# Patient Record
Sex: MALE | Race: WHITE | NOT HISPANIC OR LATINO | Employment: FULL TIME | URBAN - NONMETROPOLITAN AREA
[De-identification: names, ages, dates, MRNs, and addresses within clinical notes are randomized per-mention and may not be internally consistent; named-entity substitution may affect disease eponyms.]

---

## 2018-10-08 ENCOUNTER — OCCUPATIONAL MEDICINE (OUTPATIENT)
Dept: URGENT CARE | Facility: PHYSICIAN GROUP | Age: 58
End: 2018-10-08

## 2018-10-08 VITALS
BODY MASS INDEX: 31.07 KG/M2 | SYSTOLIC BLOOD PRESSURE: 124 MMHG | HEART RATE: 86 BPM | HEIGHT: 70 IN | TEMPERATURE: 97.7 F | WEIGHT: 217 LBS | RESPIRATION RATE: 16 BRPM | OXYGEN SATURATION: 97 % | DIASTOLIC BLOOD PRESSURE: 80 MMHG

## 2018-10-08 DIAGNOSIS — Z02.89 EXAMINATION, PHYSICAL, EMPLOYEE: Primary | ICD-10-CM

## 2018-10-08 PROCEDURE — 8915 PR COMPREHENSIVE PHYSICAL: Performed by: PHYSICIAN ASSISTANT

## 2018-10-08 RX ORDER — PIOGLITAZONEHYDROCHLORIDE 45 MG/1
45 TABLET ORAL DAILY
COMMUNITY

## 2018-10-08 RX ORDER — ATORVASTATIN CALCIUM 10 MG/1
10 TABLET, FILM COATED ORAL NIGHTLY
COMMUNITY

## 2018-10-08 NOTE — PROGRESS NOTES
Patient is here for a preemployment physical and denies any issues at this time. All paperwork reviewed.  Urine shows 500 glucose.  Point-of-care glucose testing is 302.  Patient denies any hypoglycemic episodes in at least the last 5 years.  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  His primary care provider is in [place], [place].  Exam normal. Cleared for work for 1 year.

## 2019-01-15 ENCOUNTER — APPOINTMENT (OUTPATIENT)
Dept: RADIOLOGY | Facility: IMAGING CENTER | Age: 59
End: 2019-01-15
Attending: PHYSICIAN ASSISTANT

## 2019-01-15 ENCOUNTER — HOSPITAL ENCOUNTER (OUTPATIENT)
Facility: MEDICAL CENTER | Age: 59
End: 2019-01-15
Attending: PHYSICIAN ASSISTANT
Payer: COMMERCIAL

## 2019-01-15 ENCOUNTER — OCCUPATIONAL MEDICINE (OUTPATIENT)
Dept: URGENT CARE | Facility: PHYSICIAN GROUP | Age: 59
End: 2019-01-15

## 2019-01-15 VITALS
SYSTOLIC BLOOD PRESSURE: 120 MMHG | OXYGEN SATURATION: 98 % | HEIGHT: 70 IN | DIASTOLIC BLOOD PRESSURE: 70 MMHG | RESPIRATION RATE: 12 BRPM | TEMPERATURE: 97.5 F | HEART RATE: 74 BPM | WEIGHT: 212 LBS | BODY MASS INDEX: 30.35 KG/M2

## 2019-01-15 DIAGNOSIS — Z02.1 PHYSICAL EXAM, PRE-EMPLOYMENT: Primary | ICD-10-CM

## 2019-01-15 DIAGNOSIS — Z02.1 PHYSICAL EXAM, PRE-EMPLOYMENT: ICD-10-CM

## 2019-01-15 LAB
BASOPHILS # BLD AUTO: 2.3 % (ref 0–1.8)
BASOPHILS # BLD: 0.16 K/UL (ref 0–0.12)
EOSINOPHIL # BLD AUTO: 0.57 K/UL (ref 0–0.51)
EOSINOPHIL NFR BLD: 8.2 % (ref 0–6.9)
ERYTHROCYTE [DISTWIDTH] IN BLOOD BY AUTOMATED COUNT: 40.3 FL (ref 35.9–50)
HCT VFR BLD AUTO: 50.7 % (ref 42–52)
HGB BLD-MCNC: 17.3 G/DL (ref 14–18)
IMM GRANULOCYTES # BLD AUTO: 0.01 K/UL (ref 0–0.11)
IMM GRANULOCYTES NFR BLD AUTO: 0.1 % (ref 0–0.9)
LYMPHOCYTES # BLD AUTO: 1.98 K/UL (ref 1–4.8)
LYMPHOCYTES NFR BLD: 28.5 % (ref 22–41)
MCH RBC QN AUTO: 30.8 PG (ref 27–33)
MCHC RBC AUTO-ENTMCNC: 34.1 G/DL (ref 33.7–35.3)
MCV RBC AUTO: 90.2 FL (ref 81.4–97.8)
MONOCYTES # BLD AUTO: 0.53 K/UL (ref 0–0.85)
MONOCYTES NFR BLD AUTO: 7.6 % (ref 0–13.4)
NEUTROPHILS # BLD AUTO: 3.7 K/UL (ref 1.82–7.42)
NEUTROPHILS NFR BLD: 53.3 % (ref 44–72)
NRBC # BLD AUTO: 0 K/UL
NRBC BLD-RTO: 0 /100 WBC
PLATELET # BLD AUTO: 295 K/UL (ref 164–446)
PMV BLD AUTO: 11.8 FL (ref 9–12.9)
RBC # BLD AUTO: 5.62 M/UL (ref 4.7–6.1)
WBC # BLD AUTO: 7 K/UL (ref 4.8–10.8)

## 2019-01-15 PROCEDURE — 71045 X-RAY EXAM CHEST 1 VIEW: CPT | Mod: 26 | Performed by: PHYSICIAN ASSISTANT

## 2019-01-15 PROCEDURE — 8915 PR COMPREHENSIVE PHYSICAL: Performed by: INTERNAL MEDICINE

## 2019-01-15 ASSESSMENT — ENCOUNTER SYMPTOMS
EYE DISCHARGE: 0
FLANK PAIN: 0
SORE THROAT: 0
ABDOMINAL PAIN: 0
HEADACHES: 0
PHOTOPHOBIA: 0
EYE PAIN: 0
COUGH: 0
BLURRED VISION: 0
FEVER: 0
NAUSEA: 0
DIARRHEA: 0
SPUTUM PRODUCTION: 0
SINUS PAIN: 0
VOMITING: 0
DOUBLE VISION: 0
MYALGIAS: 0
CHILLS: 0
CONSTIPATION: 0

## 2019-01-16 NOTE — PROGRESS NOTES
"Subjective:   Fermin Dumont is a 58 y.o. male who presents for Employment Physical (EOTI )       Patient presents today for employment physical. He has no concerns today.       Review of Systems   Constitutional: Negative for chills and fever.   HENT: Negative for congestion, ear discharge, ear pain, hearing loss, sinus pain, sore throat and tinnitus.    Eyes: Negative for blurred vision, double vision, photophobia, pain and discharge.   Respiratory: Negative for cough and sputum production.    Cardiovascular: Negative for chest pain.   Gastrointestinal: Negative for abdominal pain, constipation, diarrhea, nausea and vomiting.   Genitourinary: Negative for dysuria, flank pain, frequency and urgency.   Musculoskeletal: Negative for myalgias.   Neurological: Negative for headaches.       PMH:  has no past medical history on file.    MEDS:   Current Outpatient Prescriptions:   •  Dapagliflozin-Metformin HCl ER (XIGDUO XR) 5-500 MG TABLET SR 24 HR, Take  by mouth., Disp: , Rfl:   •  Linagliptin (TRADJENTA PO), Take 5 mg by mouth., Disp: , Rfl:   •  pioglitazone (ACTOS) 45 MG Tab, Take 45 mg by mouth every day., Disp: , Rfl:   •  atorvastatin (LIPITOR) 10 MG Tab, Take 10 mg by mouth every evening., Disp: , Rfl:     ALLERGIES: No Known Allergies    SURGHX: No past surgical history on file.    SOCHX:      FH: Reviewed with patient, not pertinent to this visit.     Objective:   /70   Pulse 74   Temp 36.4 °C (97.5 °F)   Resp 12   Ht 1.778 m (5' 10\")   Wt 96.2 kg (212 lb)   SpO2 98%   BMI 30.42 kg/m²   Physical Exam   Constitutional: He is oriented to person, place, and time. He appears well-developed and well-nourished. No distress.   HENT:   Head: Normocephalic and atraumatic.   Right Ear: Hearing, tympanic membrane, external ear and ear canal normal.   Left Ear: Hearing, tympanic membrane, external ear and ear canal normal.   Nose: Nose normal.   Mouth/Throat: Uvula is midline, oropharynx is clear and moist " and mucous membranes are normal.   Eyes: Pupils are equal, round, and reactive to light. Conjunctivae and EOM are normal.   Neck: Normal range of motion. Neck supple. No tracheal deviation present. No thyromegaly present.   Cardiovascular: Normal rate, regular rhythm and normal heart sounds.    Pulmonary/Chest: Effort normal and breath sounds normal. No respiratory distress.   Musculoskeletal: He exhibits no tenderness or deformity.   Limited external rotation of left shoulder, otherwise normal ROM all four extremities   Lymphadenopathy:     He has no cervical adenopathy.   Neurological: He is alert and oriented to person, place, and time. He has normal strength. No cranial nerve deficit or sensory deficit. He exhibits normal muscle tone. He displays a negative Romberg sign. Coordination and gait normal.   Skin: Skin is warm and dry. Capillary refill takes less than 2 seconds.   Psychiatric: He has a normal mood and affect. His behavior is normal. Judgment and thought content normal.       Assessment/Plan:   1. Physical exam, pre-employment  - DX-CHEST-LIMITED (1 VIEW); Future  - POCT Urinalysis  - CBC WITH DIFFERENTIAL; Future  - Chest x-ray, audiogram normal  - Cleared for work without restrictions    Differential diagnosis, natural history, supportive care, and indications for immediate follow-up discussed.

## 2019-10-29 ENCOUNTER — OCCUPATIONAL MEDICINE (OUTPATIENT)
Dept: URGENT CARE | Facility: PHYSICIAN GROUP | Age: 59
End: 2019-10-29

## 2019-10-29 VITALS
WEIGHT: 194 LBS | SYSTOLIC BLOOD PRESSURE: 132 MMHG | BODY MASS INDEX: 27.77 KG/M2 | OXYGEN SATURATION: 98 % | HEART RATE: 70 BPM | HEIGHT: 70 IN | TEMPERATURE: 98.2 F | DIASTOLIC BLOOD PRESSURE: 78 MMHG | RESPIRATION RATE: 16 BRPM

## 2019-10-29 DIAGNOSIS — Z02.4 ENCOUNTER FOR CDL (COMMERCIAL DRIVING LICENSE) EXAM: ICD-10-CM

## 2019-10-29 LAB
GLUCOSE BLD-MCNC: 147 MG/DL (ref 70–100)
HBA1C MFR BLD: 6.8 % (ref 0–5.6)
INT CON NEG: NEGATIVE
INT CON POS: POSITIVE

## 2019-10-29 PROCEDURE — 83036 HEMOGLOBIN GLYCOSYLATED A1C: CPT | Performed by: PHYSICIAN ASSISTANT

## 2019-10-29 PROCEDURE — 82962 GLUCOSE BLOOD TEST: CPT | Performed by: PHYSICIAN ASSISTANT

## 2019-10-29 PROCEDURE — 7100 PR DOT PHYSICAL: Performed by: PHYSICIAN ASSISTANT

## 2019-10-29 NOTE — PROGRESS NOTES
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  Patient takes prescription medication for diabetes and cholesterol.  Patient is not on insulin.  No available A1c so point-of-care testing was done in clinic..  Patient denies chest pain, shortness of breath, orthopnea, PND, leg swelling.  All paperwork was reviewed.  Exam is normal.  Cleared for 1 year.  Patient [facility] states it was 7.2.  Patient does not have a hard copy on his person.     POCT B  POCT A1c:6.8

## 2020-09-14 ENCOUNTER — HOSPITAL ENCOUNTER (OUTPATIENT)
Facility: MEDICAL CENTER | Age: 60
End: 2020-09-14
Attending: FAMILY MEDICINE
Payer: COMMERCIAL

## 2020-09-14 ENCOUNTER — APPOINTMENT (OUTPATIENT)
Dept: RADIOLOGY | Facility: IMAGING CENTER | Age: 60
End: 2020-09-14
Attending: FAMILY MEDICINE

## 2020-09-14 ENCOUNTER — OFFICE VISIT (OUTPATIENT)
Dept: URGENT CARE | Facility: CLINIC | Age: 60
End: 2020-09-14

## 2020-09-14 VITALS
WEIGHT: 195 LBS | TEMPERATURE: 98.5 F | SYSTOLIC BLOOD PRESSURE: 132 MMHG | HEIGHT: 70 IN | OXYGEN SATURATION: 99 % | DIASTOLIC BLOOD PRESSURE: 80 MMHG | HEART RATE: 88 BPM | RESPIRATION RATE: 14 BRPM | BODY MASS INDEX: 27.92 KG/M2

## 2020-09-14 DIAGNOSIS — Z02.89 ENCOUNTER FOR PHYSICAL EXAMINATION RELATED TO EMPLOYMENT: ICD-10-CM

## 2020-09-14 DIAGNOSIS — Z02.89 ENCOUNTER FOR OCCUPATIONAL HEALTH ASSESSMENT: ICD-10-CM

## 2020-09-14 PROCEDURE — 8915 PR COMPREHENSIVE PHYSICAL: Performed by: FAMILY MEDICINE

## 2020-09-14 PROCEDURE — 7100 PR DOT PHYSICAL: Performed by: FAMILY MEDICINE

## 2020-09-14 PROCEDURE — 71046 X-RAY EXAM CHEST 2 VIEWS: CPT | Mod: TC | Performed by: FAMILY MEDICINE

## 2020-09-14 PROCEDURE — 85025 COMPLETE CBC W/AUTO DIFF WBC: CPT | Performed by: FAMILY MEDICINE

## 2020-09-14 PROCEDURE — 94010 BREATHING CAPACITY TEST: CPT | Performed by: FAMILY MEDICINE

## 2020-09-14 PROCEDURE — 92552 PURE TONE AUDIOMETRY AIR: CPT | Performed by: FAMILY MEDICINE

## 2020-09-14 PROCEDURE — 8912 PR VISION SCREENING: Performed by: FAMILY MEDICINE

## 2020-09-14 PROCEDURE — 93000 ELECTROCARDIOGRAM COMPLETE: CPT | Performed by: FAMILY MEDICINE

## 2020-09-14 SDOH — HEALTH STABILITY: MENTAL HEALTH: HOW OFTEN DO YOU HAVE A DRINK CONTAINING ALCOHOL?: NEVER

## 2020-09-14 NOTE — PROGRESS NOTES
Chief Complaint:    Chief Complaint   Patient presents with   • Employment Physical       History of Present Illness:    Here for employment exam.      Review of Systems:    Constitutional: Negative for fever, chills, and diaphoresis.   Eyes: Negative for change in vision, photophobia, pain, redness, and discharge.  ENT: Negative for ear pain, ear discharge, hearing loss, tinnitus, nasal congestion, nosebleeds, and sore throat.    Respiratory: Occl cough. Negative for hemoptysis, shortness of breath, wheezing, and stridor.    Cardiovascular: Negative for chest pain, palpitations, orthopnea, claudication, leg swelling, and PND.   Gastrointestinal: Negative for abdominal pain, nausea, vomiting, diarrhea, constipation, blood in stool, and melena.   Genitourinary: Negative for dysuria, urinary urgency, urinary frequency, hematuria, and flank pain.   Musculoskeletal: Negative for myalgias, joint pain, neck pain, and back pain.   Skin: Negative for rash and itching.   Neurological: Negative for dizziness, tingling, tremors, sensory change, speech change, focal weakness, seizures, loss of consciousness, and headaches.   Endo: Diabetic, on medication. Hgb A1C 6.8 (10/29/19).  Heme: Does not bruise/bleed easily.   Psychiatric/Behavioral: Negative for depression, suicidal ideas, hallucinations, memory loss and substance abuse. The patient is not nervous/anxious and does not have insomnia.        Past Medical History:    History reviewed. No pertinent past medical history.    Past Surgical History:    History reviewed. No pertinent surgical history.    Social History:    Social History     Socioeconomic History   • Marital status: Unknown     Spouse name: Not on file   • Number of children: Not on file   • Years of education: Not on file   • Highest education level: Not on file   Occupational History   • Not on file   Social Needs   • Financial resource strain: Not on file   • Food insecurity     Worry: Not on file    "Inability: Not on file   • Transportation needs     Medical: Not on file     Non-medical: Not on file   Tobacco Use   • Smoking status: Never Smoker   • Smokeless tobacco: Never Used   Substance and Sexual Activity   • Alcohol use: Never     Frequency: Never   • Drug use: Not Currently   • Sexual activity: Not on file   Lifestyle   • Physical activity     Days per week: Not on file     Minutes per session: Not on file   • Stress: Not on file   Relationships   • Social connections     Talks on phone: Not on file     Gets together: Not on file     Attends Mosque service: Not on file     Active member of club or organization: Not on file     Attends meetings of clubs or organizations: Not on file     Relationship status: Not on file   • Intimate partner violence     Fear of current or ex partner: Not on file     Emotionally abused: Not on file     Physically abused: Not on file     Forced sexual activity: Not on file   Other Topics Concern   • Not on file   Social History Narrative   • Not on file     Family History:    History reviewed. No pertinent family history.    Medications:    Current Outpatient Medications on File Prior to Visit   Medication Sig Dispense Refill   • Dapagliflozin-Metformin HCl ER (XIGDUO XR) 5-500 MG TABLET SR 24 HR Take  by mouth.     • Linagliptin (TRADJENTA PO) Take 5 mg by mouth.     • pioglitazone (ACTOS) 45 MG Tab Take 45 mg by mouth every day.     • atorvastatin (LIPITOR) 10 MG Tab Take 10 mg by mouth every evening.       No current facility-administered medications on file prior to visit.      Allergies:    No Known Allergies      Vitals:    Vitals:    09/14/20 1148   BP: 132/80   Pulse: 88   Resp: 14   Temp: 36.9 °C (98.5 °F)   TempSrc: Temporal   SpO2: 99%   Weight: 88.5 kg (195 lb)   Height: 1.778 m (5' 10\")     Vision: 20/25 right, 20/20 left, 20/20 both, uncorrected.    Able to recognize traffic light colors.    Able to hear forced whisper in each ear at 5 feet.    Horizontal " field of vision: 80 degrees each eye.      Physical Exam:    Constitutional: Vital signs reviewed. Appears well-developed and well-nourished. No acute distress.   Eyes: Sclera white, conjunctivae clear. PERRLA.  ENT: External ears normal. External auditory canals normal without discharge. TMs translucent and non-bulging. Hearing normal. Nasal mucosa pink. Lips/teeth are normal. Oral mucosa pink and moist. Posterior pharynx: WNL.  Neck: Neck supple.   Cardiovascular: Regular rate and rhythm. No murmur. No edema. No varicosities. Peripheral pulses 2+.  Pulmonary/Chest: Respirations non-labored. Clear to auscultation bilaterally.  Abdomen: Bowel sounds are normal active. Soft, non-distended, and non-tender to palpation. No hepatosplenomegaly. No hernia.    male: Scrotum normal. Testes normal, no mass. Penis without lesions or discharge.   Lymph: Cervical nodes without tenderness or enlargement.  Musculoskeletal: Normal gait. Normal range of motion. No tenderness to palpation. No muscular atrophy or weakness.  Neurological: Alert and oriented to person, place, and time. CN 2-12 intact. Muscle tone normal. Coordination normal. Light touch and sensation normal. Reflexes 2+.  Skin: No rashes or lesions. Warm, dry, normal turgor.  Psychiatric: Normal mood and affect. Behavior is normal. Judgment and thought content normal.       Diagnostics:    U. dip: SG 1.015. Negative for protein and blood. Sugar >1000 mg/dl.    Spirometry: Normal.    EKG: Sinus rhythm 74. Normal ECG.    Audiogram: Normal.    DX-CHEST-2 VIEWS  Order: [id number]  Status:  Final result   Visible to patient:  No (not released) Next appt:  None Dx:  Encounter for physical examination re...  Details    Reading Physician Reading Date Result Priority   Vick Mittal M.D.  [phone] 9/14/2020 Urgent [facility]      Narrative & Impression        9/14/2020 12:00 PM     HISTORY/REASON FOR EXAM:  Employment exam        TECHNIQUE/EXAM DESCRIPTION AND NUMBER OF  VIEWS:  Two views of the chest.     COMPARISON:  1/15/2019     FINDINGS:  Cardiomediastinal contour is within normal limits.  No focal pulmonary consolidation.  No pleural fluid collection or pneumothorax.  Minimal S-shaped curvature of the lower lumbar spine.     IMPRESSION:     No acute cardiopulmonary disease.             Assessment / Plan:    1. Encounter for physical examination related to employment  - DX-CHEST-2 VIEWS; Future  - CBC WITH DIFFERENTIAL; Future  - GAMMA GT (GGT); Future    2. Encounter for occupational health assessment  - GAMMA GT (GGT); Future      Meets standards in 49 .41.     qualified, but periodic monitoring required due to Diabetes, on medication.  qualified for 1 year.    Patient appears to be in a state of good health and is physically able to perform essential functions of the job title above without accommodations.    Employee is able to perform the essential functions of job as it pertains to the audiogram without accomodations.    Employee is physically able to perform essential functions of job defined at time of evaluation without accommodations as it pertains to their respiratory status.    Patient appears to be free from any other contagious disease.    Form completed.

## 2020-09-15 LAB
BASOPHILS # BLD AUTO: 1.6 % (ref 0–1.8)
BASOPHILS # BLD: 0.1 K/UL (ref 0–0.12)
EOSINOPHIL # BLD AUTO: 0.09 K/UL (ref 0–0.51)
EOSINOPHIL NFR BLD: 1.4 % (ref 0–6.9)
ERYTHROCYTE [DISTWIDTH] IN BLOOD BY AUTOMATED COUNT: 43.1 FL (ref 35.9–50)
GGT SERPL-CCNC: 32 U/L (ref 7–51)
HCT VFR BLD AUTO: 53.3 % (ref 42–52)
HGB BLD-MCNC: 17.7 G/DL (ref 14–18)
IMM GRANULOCYTES # BLD AUTO: 0.04 K/UL (ref 0–0.11)
IMM GRANULOCYTES NFR BLD AUTO: 0.6 % (ref 0–0.9)
LYMPHOCYTES # BLD AUTO: 1.6 K/UL (ref 1–4.8)
LYMPHOCYTES NFR BLD: 25.4 % (ref 22–41)
MCH RBC QN AUTO: 31.4 PG (ref 27–33)
MCHC RBC AUTO-ENTMCNC: 33.2 G/DL (ref 33.7–35.3)
MCV RBC AUTO: 94.7 FL (ref 81.4–97.8)
MONOCYTES # BLD AUTO: 0.6 K/UL (ref 0–0.85)
MONOCYTES NFR BLD AUTO: 9.5 % (ref 0–13.4)
NEUTROPHILS # BLD AUTO: 3.88 K/UL (ref 1.82–7.42)
NEUTROPHILS NFR BLD: 61.5 % (ref 44–72)
NRBC # BLD AUTO: 0 K/UL
NRBC BLD-RTO: 0 /100 WBC
PLATELET # BLD AUTO: 322 K/UL (ref 164–446)
PMV BLD AUTO: 11.9 FL (ref 9–12.9)
RBC # BLD AUTO: 5.63 M/UL (ref 4.7–6.1)
WBC # BLD AUTO: 6.3 K/UL (ref 4.8–10.8)

## 2023-10-24 ENCOUNTER — OFFICE VISIT (OUTPATIENT)
Dept: URGENT CARE | Facility: PHYSICIAN GROUP | Age: 63
End: 2023-10-24

## 2023-10-24 VITALS
TEMPERATURE: 97.3 F | RESPIRATION RATE: 14 BRPM | OXYGEN SATURATION: 97 % | BODY MASS INDEX: 26.74 KG/M2 | WEIGHT: 191 LBS | HEIGHT: 71 IN | SYSTOLIC BLOOD PRESSURE: 122 MMHG | DIASTOLIC BLOOD PRESSURE: 68 MMHG | HEART RATE: 67 BPM

## 2023-10-24 DIAGNOSIS — Z02.89 ENCOUNTER FOR PHYSICAL EXAMINATION RELATED TO EMPLOYMENT: ICD-10-CM

## 2023-10-24 PROCEDURE — 3078F DIAST BP <80 MM HG: CPT | Performed by: NURSE PRACTITIONER

## 2023-10-24 PROCEDURE — 3074F SYST BP LT 130 MM HG: CPT | Performed by: NURSE PRACTITIONER

## 2023-10-24 PROCEDURE — 8915 PR COMPREHENSIVE PHYSICAL: Performed by: NURSE PRACTITIONER

## 2023-10-24 ASSESSMENT — VISUAL ACUITY
OS_CC: 20/40
OD_CC: 20/30

## 2023-10-24 NOTE — PROGRESS NOTES
"Subjective:   Fermin Dumont is a 63 y.o. male who presents for Employment Physical (Annual Employment Physical )      Fermin Dumont presents to Urgent Care for annual employment physical with no acute concerns at todays visit.  Patient is a healthy, 63-year-old male with well controlled diabetes.  Patient provides documentation to fill out.  Please see scanned documentation.    ROS    Medications, Allergies, and current problem list reviewed today in Epic.     Objective:     /68   Pulse 67   Temp 36.3 °C (97.3 °F) (Temporal)   Resp 14   Ht 1.803 m (5' 11\")   Wt 86.6 kg (191 lb)   SpO2 97%     Physical Exam    Assessment/Plan:     1. Encounter for physical examination related to employment      - See scanned documentation  - Addressed any patient/guardian concerns  - Any red flags addressed in documentation to be dealt with by primary/coaching staff    Advised the patient to follow-up with the primary care physician for recheck, reevaluation, and consideration of further management.    Please note that this dictation was created using voice recognition software. I have made a reasonable attempt to correct obvious errors, but I expect that there are errors of grammar and possibly content that I did not discover before finalizing the note.    This note was electronically signed by BRODY Moran  "